# Patient Record
Sex: MALE | Race: WHITE | Employment: FULL TIME | URBAN - METROPOLITAN AREA
[De-identification: names, ages, dates, MRNs, and addresses within clinical notes are randomized per-mention and may not be internally consistent; named-entity substitution may affect disease eponyms.]

---

## 2019-05-21 ENCOUNTER — OFFICE VISIT (OUTPATIENT)
Dept: URGENT CARE | Facility: URGENT CARE | Age: 24
End: 2019-05-21
Payer: COMMERCIAL

## 2019-05-21 VITALS
WEIGHT: 157 LBS | DIASTOLIC BLOOD PRESSURE: 77 MMHG | HEART RATE: 83 BPM | RESPIRATION RATE: 18 BRPM | TEMPERATURE: 97.6 F | SYSTOLIC BLOOD PRESSURE: 159 MMHG | OXYGEN SATURATION: 100 %

## 2019-05-21 DIAGNOSIS — M62.830 BACK MUSCLE SPASM: Primary | ICD-10-CM

## 2019-05-21 PROCEDURE — 99204 OFFICE O/P NEW MOD 45 MIN: CPT | Performed by: PHYSICIAN ASSISTANT

## 2019-05-21 RX ORDER — CYCLOBENZAPRINE HCL 10 MG
10 TABLET ORAL 3 TIMES DAILY PRN
Qty: 30 TABLET | Refills: 0 | Status: SHIPPED | OUTPATIENT
Start: 2019-05-21

## 2019-05-21 ASSESSMENT — ENCOUNTER SYMPTOMS
MYALGIAS: 0
EYE ITCHING: 0
VOMITING: 0
NAUSEA: 0
ENDOCRINE NEGATIVE: 1
CARDIOVASCULAR NEGATIVE: 1
LIGHT-HEADEDNESS: 0
CHEST TIGHTNESS: 0
HEMATURIA: 0
POLYDIPSIA: 0
CHILLS: 0
DIAPHORESIS: 0
GASTROINTESTINAL NEGATIVE: 1
WHEEZING: 0
NEUROLOGICAL NEGATIVE: 1
EYE DISCHARGE: 0
ADENOPATHY: 0
SHORTNESS OF BREATH: 0
FREQUENCY: 0
ABDOMINAL PAIN: 0
COUGH: 0
HEADACHES: 0
SORE THROAT: 0
EYE REDNESS: 0
DIARRHEA: 0
FEVER: 0
CONSTITUTIONAL NEGATIVE: 1
EYES NEGATIVE: 1
DYSURIA: 0
PALPITATIONS: 0
WEAKNESS: 0
RESPIRATORY NEGATIVE: 1
DIZZINESS: 0
RHINORRHEA: 0
BACK PAIN: 1

## 2019-05-21 ASSESSMENT — PAIN SCALES - GENERAL: PAINLEVEL: MODERATE PAIN (5)

## 2019-05-21 NOTE — PROGRESS NOTES
Chief Complaint:    Chief Complaint   Patient presents with     Back Pain     mid back started saturday and got worse gradually       HPI: Rashaad Solo is an 23 year old male who presents for evaluation and treatment of mid back pain.  The symptoms started 3 days ago when he was putting his clothes on. The pain comes and goes and is sharp in nature.  The pain does not radiate.  He has not tried anything for the pain.     Patient is new to [place].      ROS:      Review of Systems   Constitutional: Negative.  Negative for chills, diaphoresis and fever.   HENT: Negative.  Negative for congestion, ear pain, rhinorrhea and sore throat.    Eyes: Negative.  Negative for discharge, redness and itching.   Respiratory: Negative.  Negative for cough, chest tightness, shortness of breath and wheezing.    Cardiovascular: Negative.  Negative for chest pain and palpitations.   Gastrointestinal: Negative.  Negative for abdominal pain, diarrhea, nausea and vomiting.   Endocrine: Negative.  Negative for polydipsia and polyuria.   Genitourinary: Negative for dysuria, frequency, hematuria and urgency.   Musculoskeletal: Positive for back pain. Negative for myalgias.   Skin: Negative for rash.   Allergic/Immunologic: Negative for immunocompromised state.   Neurological: Negative.  Negative for dizziness, weakness, light-headedness and headaches.   Hematological: Negative for adenopathy.        No pertinent family or medical Hx at this time.  Patient has never smoked.  No pertinent surgical Hx at this time.    Family History   Family History   Problem Relation Age of Onset     Diabetes Maternal Grandmother      Depression Maternal Grandmother      Arthritis Maternal Grandfather         rheumatoid arthritis     Cerebrovascular Disease Maternal Grandfather      Breast Cancer Paternal Grandmother      C.A.D. No family hx of        Social History  Social History     Socioeconomic History     Marital status: Single     Spouse name: Not  on file     Number of children: Not on file     Years of education: Not on file     Highest education level: Not on file   Occupational History     Not on file   Social Needs     Financial resource strain: Not on file     Food insecurity:     Worry: Not on file     Inability: Not on file     Transportation needs:     Medical: Not on file     Non-medical: Not on file   Tobacco Use     Smoking status: Never Smoker     Smokeless tobacco: Never Used   Substance and Sexual Activity     Alcohol use: No     Drug use: Not on file     Sexual activity: Not on file   Lifestyle     Physical activity:     Days per week: Not on file     Minutes per session: Not on file     Stress: Not on file   Relationships     Social connections:     Talks on phone: Not on file     Gets together: Not on file     Attends Yazidism service: Not on file     Active member of club or organization: Not on file     Attends meetings of clubs or organizations: Not on file     Relationship status: Not on file     Intimate partner violence:     Fear of current or ex partner: Not on file     Emotionally abused: Not on file     Physically abused: Not on file     Forced sexual activity: Not on file   Other Topics Concern     Not on file   Social History Narrative     Not on file        Surgical History:  Past Surgical History:   Procedure Laterality Date     SURGICAL HISTORY OF -   7 yrs old  x 2    Left  forearm surgery        Problem List:  There is no problem list on file for this patient.       Allergies:  No Known Allergies     Current Meds:    Current Outpatient Medications:      cyclobenzaprine (FLEXERIL) 10 MG tablet, Take 1 tablet (10 mg) by mouth 3 times daily as needed for muscle spasms, Disp: 30 tablet, Rfl: 0     PHYSICAL EXAM:     Vital signs noted and reviewed by Royce Ayers  /77 (BP Location: Left arm, Patient Position: Chair, Cuff Size: Adult Regular)   Pulse 83   Temp 97.6  F (36.4  C) (Oral)   Resp 18   Wt 71.2 kg (157 lb)    SpO2 100%      PEFR:    Physical Exam   Constitutional: He appears well-developed and well-nourished. He is cooperative.  Non-toxic appearance. He does not have a sickly appearance. He does not appear ill. No distress.   HENT:   Head: Normocephalic and atraumatic.   Right Ear: Hearing, tympanic membrane, external ear and ear canal normal. Tympanic membrane is not perforated, not erythematous, not retracted and not bulging.   Left Ear: Hearing, tympanic membrane, external ear and ear canal normal. Tympanic membrane is not perforated, not erythematous, not retracted and not bulging.   Nose: Nose normal. No mucosal edema or rhinorrhea.   Mouth/Throat: Oropharynx is clear and moist and mucous membranes are normal. No oropharyngeal exudate, posterior oropharyngeal edema, posterior oropharyngeal erythema or tonsillar abscesses. Tonsils are 0 on the right. Tonsils are 0 on the left. No tonsillar exudate.   Eyes: Pupils are equal, round, and reactive to light. EOM are normal.   Neck: Normal range of motion. Neck supple.   Cardiovascular: Normal rate, regular rhythm, S1 normal, S2 normal, normal heart sounds and intact distal pulses. Exam reveals no gallop, no distant heart sounds and no friction rub.   No murmur heard.  Pulmonary/Chest: Effort normal and breath sounds normal. No respiratory distress. He has no decreased breath sounds. He has no wheezes. He has no rhonchi. He has no rales.   Abdominal: Soft. Bowel sounds are normal. He exhibits no distension. There is no tenderness.   Lymphadenopathy:     He has no cervical adenopathy.   Neurological: He is alert. He has normal strength and normal reflexes. He displays no atrophy and normal reflexes. No cranial nerve deficit or sensory deficit. He exhibits normal muscle tone. Coordination and gait normal.   Reflex Scores:       Tricep reflexes are 2+ on the right side and 2+ on the left side.       Bicep reflexes are 2+ on the right side and 2+ on the left side.        Brachioradialis reflexes are 2+ on the right side and 2+ on the left side.       Patellar reflexes are 2+ on the right side and 2+ on the left side.       Achilles reflexes are 2+ on the right side and 2+ on the left side.  Skin: Skin is warm and dry. No rash noted. He is not diaphoretic.   Psychiatric: He has a normal mood and affect. His speech is normal and behavior is normal. Judgment and thought content normal. Cognition and memory are normal. He is attentive.   Nursing note and vitals reviewed.       Labs:       Medical Decision Making:    Differential Diagnosis:  Back Pain: possible herniated disc, mid back strain  and muscle spasm      ASSESSMENT:     1. Back muscle spasm         PLAN:     I was not able to reproduce the pain in clinic today.  Neuro exam was benign  Rx for Flexeril for muscle spasm.  Ice to the area.  Ibuprofen for any discomfort.  Chiropractic information given to patient.  He will follow up with his PCP in 2 weeks if symptoms have not resolved.  Worrisome symptoms discussed with instructions to go to the ED.  Patient verbalized understanding and agreed with this plan.     Royce Ayers  5/21/2019, 6:29 PM

## 2019-11-03 ENCOUNTER — HEALTH MAINTENANCE LETTER (OUTPATIENT)
Age: 24
End: 2019-11-03

## 2020-11-16 ENCOUNTER — HEALTH MAINTENANCE LETTER (OUTPATIENT)
Age: 25
End: 2020-11-16

## 2021-09-18 ENCOUNTER — HEALTH MAINTENANCE LETTER (OUTPATIENT)
Age: 26
End: 2021-09-18

## 2022-01-02 ENCOUNTER — HEALTH MAINTENANCE LETTER (OUTPATIENT)
Age: 27
End: 2022-01-02

## 2022-11-19 ENCOUNTER — HEALTH MAINTENANCE LETTER (OUTPATIENT)
Age: 27
End: 2022-11-19

## 2023-04-09 ENCOUNTER — HEALTH MAINTENANCE LETTER (OUTPATIENT)
Age: 28
End: 2023-04-09